# Patient Record
Sex: FEMALE | Race: WHITE | NOT HISPANIC OR LATINO | Employment: OTHER | URBAN - METROPOLITAN AREA
[De-identification: names, ages, dates, MRNs, and addresses within clinical notes are randomized per-mention and may not be internally consistent; named-entity substitution may affect disease eponyms.]

---

## 2020-03-26 PROBLEM — G35 MS (MULTIPLE SCLEROSIS): Status: ACTIVE | Noted: 2020-03-26

## 2020-03-26 PROBLEM — N36.44 DETRUSOR SPHINCTER DYSSYNERGIA: Status: ACTIVE | Noted: 2020-03-26

## 2020-03-26 PROBLEM — G47.00 INSOMNIA: Status: ACTIVE | Noted: 2020-03-26

## 2020-03-26 PROBLEM — R51.9 NONINTRACTABLE EPISODIC HEADACHE: Status: ACTIVE | Noted: 2020-03-26

## 2020-03-26 PROBLEM — R53.83 FATIGUE: Status: ACTIVE | Noted: 2020-03-26

## 2020-03-26 PROBLEM — R35.1 NOCTURIA: Status: ACTIVE | Noted: 2020-03-26

## 2021-03-09 PROBLEM — R56.9 SEIZURES: Status: ACTIVE | Noted: 2021-03-09

## 2021-04-28 DIAGNOSIS — Z12.31 OTHER SCREENING MAMMOGRAM: ICD-10-CM

## 2021-08-10 ENCOUNTER — PATIENT OUTREACH (OUTPATIENT)
Dept: ADMINISTRATIVE | Facility: HOSPITAL | Age: 61
End: 2021-08-10

## 2021-12-21 ENCOUNTER — PATIENT MESSAGE (OUTPATIENT)
Dept: NEUROLOGY | Facility: CLINIC | Age: 61
End: 2021-12-21

## 2022-02-28 ENCOUNTER — PATIENT MESSAGE (OUTPATIENT)
Dept: PSYCHIATRY | Facility: CLINIC | Age: 62
End: 2022-02-28

## 2022-06-24 ENCOUNTER — PATIENT MESSAGE (OUTPATIENT)
Dept: PSYCHIATRY | Facility: CLINIC | Age: 62
End: 2022-06-24

## 2022-11-09 ENCOUNTER — HOSPITAL ENCOUNTER (EMERGENCY)
Facility: HOSPITAL | Age: 62
Discharge: HOME | End: 2022-11-09
Attending: EMERGENCY MEDICINE
Payer: COMMERCIAL

## 2022-11-09 VITALS
SYSTOLIC BLOOD PRESSURE: 126 MMHG | OXYGEN SATURATION: 99 % | HEART RATE: 83 BPM | DIASTOLIC BLOOD PRESSURE: 75 MMHG | RESPIRATION RATE: 16 BRPM | HEIGHT: 62 IN | TEMPERATURE: 99 F | WEIGHT: 150 LBS | BODY MASS INDEX: 27.6 KG/M2
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
## 2022-11-09 DIAGNOSIS — S20.00XA: ICD-10-CM

## 2022-11-09 DIAGNOSIS — T14.90XA INJURY: ICD-10-CM

## 2022-11-09 DIAGNOSIS — S20.219A STERNAL CONTUSION, INITIAL ENCOUNTER: Primary | ICD-10-CM

## 2022-11-09 DIAGNOSIS — R07.9 CHEST PAIN: ICD-10-CM

## 2022-11-09 LAB
ALBUMIN SERPL BCP-MCNC: 3.8 G/DL (ref 3.5–5.2)
ALP SERPL-CCNC: 70 U/L (ref 55–135)
ALT SERPL W/O P-5'-P-CCNC: 13 U/L (ref 10–44)
ANION GAP SERPL CALC-SCNC: 11 MMOL/L (ref 8–16)
AST SERPL-CCNC: 12 U/L (ref 10–40)
BASOPHILS # BLD AUTO: 0.07 K/UL (ref 0–0.2)
BASOPHILS NFR BLD: 0.7 % (ref 0–1.9)
BILIRUB SERPL-MCNC: 0.5 MG/DL (ref 0.1–1)
BILIRUB UR QL STRIP: NEGATIVE
BUN SERPL-MCNC: 12 MG/DL (ref 6–20)
CALCIUM SERPL-MCNC: 9.5 MG/DL (ref 8.7–10.5)
CHLORIDE SERPL-SCNC: 102 MMOL/L (ref 95–110)
CLARITY UR: CLEAR
CO2 SERPL-SCNC: 28 MMOL/L (ref 23–29)
COLOR UR: YELLOW
CREAT SERPL-MCNC: 0.6 MG/DL (ref 0.5–1.4)
DIFFERENTIAL METHOD: ABNORMAL
EOSINOPHIL # BLD AUTO: 0.2 K/UL (ref 0–0.5)
EOSINOPHIL NFR BLD: 1.4 % (ref 0–8)
ERYTHROCYTE [DISTWIDTH] IN BLOOD BY AUTOMATED COUNT: 12.5 % (ref 11.5–14.5)
EST. GFR  (NO RACE VARIABLE): >60 ML/MIN/1.73 M^2
GLUCOSE SERPL-MCNC: 117 MG/DL (ref 70–110)
GLUCOSE UR QL STRIP: NEGATIVE
HCT VFR BLD AUTO: 37.9 % (ref 37–48.5)
HGB BLD-MCNC: 12.7 G/DL (ref 12–16)
HGB UR QL STRIP: NEGATIVE
IMM GRANULOCYTES # BLD AUTO: 0.03 K/UL (ref 0–0.04)
IMM GRANULOCYTES NFR BLD AUTO: 0.3 % (ref 0–0.5)
INFLUENZA A, MOLECULAR: NEGATIVE
INFLUENZA B, MOLECULAR: NEGATIVE
KETONES UR QL STRIP: NEGATIVE
LEUKOCYTE ESTERASE UR QL STRIP: NEGATIVE
LYMPHOCYTES # BLD AUTO: 1.2 K/UL (ref 1–4.8)
LYMPHOCYTES NFR BLD: 11.2 % (ref 18–48)
MCH RBC QN AUTO: 30.2 PG (ref 27–31)
MCHC RBC AUTO-ENTMCNC: 33.5 G/DL (ref 32–36)
MCV RBC AUTO: 90 FL (ref 82–98)
MONOCYTES # BLD AUTO: 1 K/UL (ref 0.3–1)
MONOCYTES NFR BLD: 9.5 % (ref 4–15)
NEUTROPHILS # BLD AUTO: 8 K/UL (ref 1.8–7.7)
NEUTROPHILS NFR BLD: 76.9 % (ref 38–73)
NITRITE UR QL STRIP: NEGATIVE
NRBC BLD-RTO: 0 /100 WBC
PH UR STRIP: 7 [PH] (ref 5–8)
PLATELET # BLD AUTO: 266 K/UL (ref 150–450)
PMV BLD AUTO: 9.1 FL (ref 9.2–12.9)
POTASSIUM SERPL-SCNC: 3.7 MMOL/L (ref 3.5–5.1)
PROT SERPL-MCNC: 6.9 G/DL (ref 6–8.4)
PROT UR QL STRIP: NEGATIVE
RBC # BLD AUTO: 4.21 M/UL (ref 4–5.4)
SARS-COV-2 RDRP RESP QL NAA+PROBE: NEGATIVE
SODIUM SERPL-SCNC: 141 MMOL/L (ref 136–145)
SP GR UR STRIP: 1.02 (ref 1–1.03)
SPECIMEN SOURCE: NORMAL
URN SPEC COLLECT METH UR: NORMAL
UROBILINOGEN UR STRIP-ACNC: NEGATIVE EU/DL
WBC # BLD AUTO: 10.39 K/UL (ref 3.9–12.7)

## 2022-11-09 PROCEDURE — 71045 X-RAY EXAM CHEST 1 VIEW: CPT | Mod: 26,,, | Performed by: RADIOLOGY

## 2022-11-09 PROCEDURE — 71045 XR CHEST AP PORTABLE: ICD-10-PCS | Mod: 26,,, | Performed by: RADIOLOGY

## 2022-11-09 PROCEDURE — 85025 COMPLETE CBC W/AUTO DIFF WBC: CPT | Performed by: EMERGENCY MEDICINE

## 2022-11-09 PROCEDURE — 71045 X-RAY EXAM CHEST 1 VIEW: CPT | Mod: TC

## 2022-11-09 PROCEDURE — 93005 ELECTROCARDIOGRAM TRACING: CPT

## 2022-11-09 PROCEDURE — 80053 COMPREHEN METABOLIC PANEL: CPT | Performed by: EMERGENCY MEDICINE

## 2022-11-09 PROCEDURE — 71120 XR STERNUM PA AND LATERAL: ICD-10-PCS | Mod: 26,,, | Performed by: RADIOLOGY

## 2022-11-09 PROCEDURE — 71120 X-RAY EXAM BREASTBONE 2/>VWS: CPT | Mod: 26,,, | Performed by: RADIOLOGY

## 2022-11-09 PROCEDURE — U0002 COVID-19 LAB TEST NON-CDC: HCPCS | Performed by: EMERGENCY MEDICINE

## 2022-11-09 PROCEDURE — 71120 X-RAY EXAM BREASTBONE 2/>VWS: CPT | Mod: TC

## 2022-11-09 PROCEDURE — 99285 EMERGENCY DEPT VISIT HI MDM: CPT | Mod: 25

## 2022-11-09 PROCEDURE — 93010 EKG 12-LEAD: ICD-10-PCS | Mod: ,,, | Performed by: INTERNAL MEDICINE

## 2022-11-09 PROCEDURE — 81003 URINALYSIS AUTO W/O SCOPE: CPT | Performed by: EMERGENCY MEDICINE

## 2022-11-09 PROCEDURE — 87502 INFLUENZA DNA AMP PROBE: CPT | Performed by: EMERGENCY MEDICINE

## 2022-11-09 PROCEDURE — 93010 ELECTROCARDIOGRAM REPORT: CPT | Mod: ,,, | Performed by: INTERNAL MEDICINE

## 2022-11-09 RX ORDER — METHOCARBAMOL 500 MG/1
1000 TABLET, FILM COATED ORAL 3 TIMES DAILY
Qty: 30 TABLET | Refills: 0 | Status: SHIPPED | OUTPATIENT
Start: 2022-11-09 | End: 2022-11-14

## 2022-11-09 RX ORDER — HYDROCODONE BITARTRATE AND ACETAMINOPHEN 7.5; 325 MG/1; MG/1
1 TABLET ORAL EVERY 6 HOURS PRN
Qty: 12 TABLET | Refills: 0 | Status: SHIPPED | OUTPATIENT
Start: 2022-11-09 | End: 2023-09-12

## 2022-11-09 RX ORDER — KETOROLAC TROMETHAMINE 10 MG/1
10 TABLET, FILM COATED ORAL EVERY 6 HOURS
Qty: 15 TABLET | Refills: 0 | Status: SHIPPED | OUTPATIENT
Start: 2022-11-09 | End: 2023-09-12

## 2022-11-09 RX ORDER — METHOCARBAMOL 500 MG/1
1000 TABLET, FILM COATED ORAL 3 TIMES DAILY
Qty: 30 TABLET | Refills: 0 | Status: SHIPPED | OUTPATIENT
Start: 2022-11-09 | End: 2022-11-09 | Stop reason: SDUPTHER

## 2022-11-09 NOTE — Clinical Note
"Maggie"Angelique Wallace was seen and treated in our emergency department on 11/9/2022.  She may return to work on 11/12/2022.       If you have any questions or concerns, please don't hesitate to call.      Wallace Champion MD"

## 2022-11-09 NOTE — ED PROVIDER NOTES
Encounter Date: 2022       History     Chief Complaint   Patient presents with    Fall     Pt reports a trip and fall the the parking lot over the weekend and fell on her chest. Pt reports chest soreness x 3 days. Pt states she fell on the point of a book she was holding into her chest.    Chest Injury     Well-developed 8-year-old female comes emergency complaints of having tripped and fallen caring a book 3 days ago.  She landed on the ground.  The corners of acute her in her sternum.  Also in her right chest.  Also cause an ecchymosis of her left breast medially.  Three days later today she comes emergency room complaints of worsening pain.  No shortness of breath.  She has have a fever for the last 3 days.  Uncertain etiology.  Also states that she has run a fever for the last 3 days uncertain etiology for this as well.  Was flu negative at an urgent care locally.    Review of patient's allergies indicates:   Allergen Reactions    Preservative Swelling     Paraben (ingredient in cosmetics) =  swelling of eyes - Charzes Almaraz [facility]UAN [facility]   2018    Oxybutynin Other (See Comments)     Heartburn, began noting food becoming stuck in throat     Past Medical History:   Diagnosis Date    Multiple sclerosis      Past Surgical History:   Procedure Laterality Date    ANKLE SURGERY Left      SECTION      KNEE SURGERY Left      Family History   Family history unknown: Yes     Social History     Tobacco Use    Smoking status: Never    Smokeless tobacco: Never   Substance Use Topics    Alcohol use: Yes     Comment: social    Drug use: Never     Review of Systems   Constitutional:  Positive for fever. Negative for activity change and fatigue.   Respiratory:  Positive for cough. Negative for shortness of breath.    Cardiovascular:  Positive for chest pain (Anterior chest wall sternal pain secondary to fall 3 days ago.). Negative for palpitations.   Gastrointestinal: Negative.    Musculoskeletal:  Positive for  arthralgias (Right anterior chest wall secondary to fall 3 days ago.).   All other systems reviewed and are negative.    Physical Exam     Initial Vitals [11/09/22 1158]   BP Pulse Resp Temp SpO2   113/74 84 18 99.1 °F (37.3 °C) 96 %      MAP       --         Physical Exam    Nursing note and vitals reviewed.  Constitutional: She appears well-developed and well-nourished.   HENT:   Head: Normocephalic and atraumatic.   Neck: Neck supple.   Normal range of motion.  Cardiovascular:  Normal rate, regular rhythm and normal heart sounds.           Pulmonary/Chest: Breath sounds normal. She exhibits tenderness (Midsternal chest tenderness to palpation).   Musculoskeletal:         General: Normal range of motion.      Cervical back: Normal range of motion and neck supple.     Neurological: She is alert and oriented to person, place, and time. She has normal strength.   Skin: Skin is warm.   Ecchymosis of the left breast medially.  No mass.  No hematoma.   Psychiatric: She has a normal mood and affect. Thought content normal.       ED Course   Procedures  Labs Reviewed   CBC W/ AUTO DIFFERENTIAL - Abnormal; Notable for the following components:       Result Value    MPV 9.1 (*)     Gran # (ANC) 8.0 (*)     Gran % 76.9 (*)     Lymph % 11.2 (*)     All other components within normal limits   COMPREHENSIVE METABOLIC PANEL - Abnormal; Notable for the following components:    Glucose 117 (*)     All other components within normal limits   INFLUENZA A & B BY MOLECULAR   URINALYSIS, REFLEX TO URINE CULTURE    Narrative:     Preferred Collection Type->Urine, Clean Catch  Specimen Source->Urine   SARS-COV-2 RNA AMPLIFICATION, QUAL    Narrative:     Is the patient symptomatic?->No          Imaging Results              X-Ray Sternum (Final result)  Result time 11/09/22 13:46:10      Final result by Sandra Trinh MD (11/09/22 13:46:10)                   Impression:      No evidence of a sternal fracture.      Electronically signed  by: Sandra Trinh  Date:    11/09/2022  Time:    13:46               Narrative:    EXAMINATION:  XR STERNUM PA AND LATERAL    CLINICAL HISTORY:  Unspecified fall, initial encounter    TECHNIQUE:  Oblique and lateral views of the sternum were obtained    COMPARISON:  Portable chest x-ray earlier today    FINDINGS:  The sternum is intact with no evidence of a fracture.  The sternoclavicular joints appear symmetrical, with degenerative changes noted.  Scoliosis and degenerative changes are incidentally noted in the thoracic spine.                                       X-Ray Chest AP Portable (Final result)  Result time 11/09/22 13:05:25      Final result by Sandra Trinh MD (11/09/22 13:05:25)                   Impression:      Borderline heart size and nonspecific interstitial opacities in the lungs.      Electronically signed by: Sandra Trinh  Date:    11/09/2022  Time:    13:05               Narrative:    EXAMINATION:  XR CHEST AP PORTABLE    CLINICAL HISTORY:  Injury, unspecified, initial encounter    TECHNIQUE:  Single frontal view of the chest was performed.    COMPARISON:  None    FINDINGS:  The heart size is borderline, and the aorta is ectatic.  There are nonspecific interstitial changes in the lungs with no focal consolidation or pleural effusion.  There is moderate thoracic dextroscoliosis.  There are left supraclavicular surgical clips.  No acute bony abnormality nor pneumothorax noted.                                       Medications - No data to display  Medical Decision Making:   Differential Diagnosis:   Lumbar fracture, compression fracture, head injury, cervical injury, intra-abdominal injury, skin injury, contusion, ecchymosis, hematoma, dislocation, sprain.    ED Management:  Patient is stable this time.  Sternal x-ray, chest x-ray negative.  Laboratory values negative as well.  Unknown source of fever.  I discussed with the patient.  Had her return emergency with any  worsening of a fever.  She understands and agrees.  Discharge pending.  Robaxin, Ketoralac, low-dose pain meds.                        Clinical Impression:   Final diagnoses:  [R07.9] Chest pain  [T14.90XA] Injury  [W19.XXXA] Fall  [S20.219A] Sternal contusion, initial encounter (Primary)  [W19.XXXA] Fall, initial encounter  [S20.00XA] Traumatic ecchymosis of female breast, initial encounter        ED Disposition Condition    Discharge Stable          ED Prescriptions       Medication Sig Dispense Start Date End Date Auth. Provider    ketorolac (TORADOL) 10 mg tablet Take 1 tablet (10 mg total) by mouth every 6 (six) hours. 15 tablet 11/9/2022 -- Wallace hCampion MD    HYDROcodone-acetaminophen (NORCO) 7.5-325 mg per tablet Take 1 tablet by mouth every 6 (six) hours as needed. 12 tablet 11/9/2022 -- Wallace Champion MD    methocarbamoL (ROBAXIN) 500 MG Tab  (Status: Discontinued) Take 2 tablets (1,000 mg total) by mouth 3 (three) times daily. for 5 days 30 tablet 11/9/2022 11/9/2022 Wallace Champion MD    methocarbamoL (ROBAXIN) 500 MG Tab Take 2 tablets (1,000 mg total) by mouth 3 (three) times daily. for 5 days 30 tablet 11/9/2022 11/14/2022 Wallace Champion MD          Follow-up Information       Follow up With Specialties Details Why Contact Info    Corey Landis MD Internal Medicine  As needed, If symptoms worsen [phone] [place] [id number] [phone]               Wallace Champion MD  11/09/22 0752       Wallace Champion MD  11/09/22 8991

## 2022-12-01 ENCOUNTER — PATIENT MESSAGE (OUTPATIENT)
Dept: PSYCHIATRY | Facility: CLINIC | Age: 62
End: 2022-12-01
Payer: COMMERCIAL

## 2023-02-20 ENCOUNTER — PATIENT MESSAGE (OUTPATIENT)
Dept: PSYCHIATRY | Facility: CLINIC | Age: 63
End: 2023-02-20
Payer: COMMERCIAL

## 2023-07-21 ENCOUNTER — PATIENT MESSAGE (OUTPATIENT)
Dept: PSYCHIATRY | Facility: CLINIC | Age: 63
End: 2023-07-21
Payer: COMMERCIAL

## 2024-01-22 ENCOUNTER — PATIENT MESSAGE (OUTPATIENT)
Dept: PSYCHIATRY | Facility: CLINIC | Age: 64
End: 2024-01-22
Payer: COMMERCIAL

## 2024-05-02 ENCOUNTER — PATIENT MESSAGE (OUTPATIENT)
Dept: PSYCHIATRY | Facility: CLINIC | Age: 64
End: 2024-05-02
Payer: COMMERCIAL

## 2024-07-25 ENCOUNTER — PATIENT MESSAGE (OUTPATIENT)
Dept: PSYCHIATRY | Facility: CLINIC | Age: 64
End: 2024-07-25
Payer: COMMERCIAL

## 2024-08-05 ENCOUNTER — PATIENT MESSAGE (OUTPATIENT)
Dept: PSYCHIATRY | Facility: CLINIC | Age: 64
End: 2024-08-05
Payer: COMMERCIAL

## 2024-12-16 ENCOUNTER — PATIENT MESSAGE (OUTPATIENT)
Dept: PSYCHIATRY | Facility: CLINIC | Age: 64
End: 2024-12-16
Payer: COMMERCIAL